# Patient Record
Sex: FEMALE | Race: ASIAN | URBAN - METROPOLITAN AREA
[De-identification: names, ages, dates, MRNs, and addresses within clinical notes are randomized per-mention and may not be internally consistent; named-entity substitution may affect disease eponyms.]

---

## 2023-05-11 ENCOUNTER — OFFICE VISIT (OUTPATIENT)
Facility: LOCATION | Age: 68
End: 2023-05-11
Payer: MEDICARE

## 2023-05-11 DIAGNOSIS — H43.811 VITREOUS DEGENERATION, RIGHT EYE: ICD-10-CM

## 2023-05-11 DIAGNOSIS — H25.13 AGE-RELATED NUCLEAR CATARACT, BILATERAL: ICD-10-CM

## 2023-05-11 DIAGNOSIS — H40.033 ANATOMICAL NARROW ANGLE, BILATERAL: Primary | ICD-10-CM

## 2023-05-11 PROCEDURE — 92133 CPTRZD OPH DX IMG PST SGM ON: CPT | Performed by: STUDENT IN AN ORGANIZED HEALTH CARE EDUCATION/TRAINING PROGRAM

## 2023-05-11 PROCEDURE — 76514 ECHO EXAM OF EYE THICKNESS: CPT | Performed by: STUDENT IN AN ORGANIZED HEALTH CARE EDUCATION/TRAINING PROGRAM

## 2023-05-11 PROCEDURE — 99204 OFFICE O/P NEW MOD 45 MIN: CPT | Performed by: STUDENT IN AN ORGANIZED HEALTH CARE EDUCATION/TRAINING PROGRAM

## 2023-05-11 PROCEDURE — 92020 GONIOSCOPY: CPT | Performed by: STUDENT IN AN ORGANIZED HEALTH CARE EDUCATION/TRAINING PROGRAM

## 2023-05-11 ASSESSMENT — INTRAOCULAR PRESSURE
OD: 19
OD: 17
OS: 19
OS: 23

## 2023-05-11 NOTE — IMPRESSION/PLAN
Impression: Anatomical narrow angle, bilateral: H40.033. CC: Patient presents today referred by Dr. Thom Lan from [facility] for glaucoma evaluation in both eyes. POHx: none FOHx: none PMHx: none SocialHx: none Eye medications: none Allergies: NKDA Tmax: unknown / pending Target IOP: pending Plan:  Testing:
OCT/ONH 05/2023: WNL OU
HVF 24-2: pending Pachy 05/2023: 510/504 Gonio 05/2023: OD open to PTM S/ Narrow T/I/N, OS Narrow Today: 	 IOP borderline acceptable OU Educated patient on the diagnosis and physiological development of glaucoma in great detail. Explained to patient of glaucoma being high eye pressure, having peripheral vision loss and enlarged/damaged optic nerves. Informed patient high eye pressure can cause damage to the optic nerves therefore leading to irreversible vision loss. Educated the patient that glaucoma is a chronic disease and there is no cure but it can be treated and controlled. Discussed different treatment options being medications, laser vs surgery. Informed patient examination shows narrow angles OS>OD therefore she is at higher risk for glaucoma. Explained to patient risk of angles closure attack and elevation of eye pressure if she were to go untreated. Stressed the importance to begin treatment as a preventive measure to control IOP and present angle closure. Discussed R/B of LPI laser in great lengths and detail with the patient. Advised patient we do not recommend dilation as for dilated the pupils can cause angle closure attack. Patient expressed understanding and is agreeable to LPI laser OS after her travel at the end of this month. Plan: 
Patient provided with LPI information packet.  
Will plan for LPI OS

## 2023-05-11 NOTE — IMPRESSION/PLAN
Impression: Vitreous degeneration, right eye: H43.811. PVD OD Plan: Educated patient on vitreous jelly  from the retina causing floaters and flashes. Informed patient with examination today 90 and 20 diopter lens no chaffer sign, no concerning signs of retinal tear or detachment seen. Discussed pathophysiology of PVD, discussed S/S of RD, patient to follow up ASAP with increasing flashes, floaters, curtain or shadow.

## 2023-05-11 NOTE — IMPRESSION/PLAN
Impression: Age-related nuclear cataract, bilateral: H25.13. Trace NS. NVS. No surgery indicated ta this time/ Plan: Will revisit once patient is visually bothered by cataracts.